# Patient Record
Sex: FEMALE | Race: ASIAN | NOT HISPANIC OR LATINO | URBAN - METROPOLITAN AREA
[De-identification: names, ages, dates, MRNs, and addresses within clinical notes are randomized per-mention and may not be internally consistent; named-entity substitution may affect disease eponyms.]

---

## 2022-05-06 ENCOUNTER — EMERGENCY (EMERGENCY)
Facility: HOSPITAL | Age: 22
LOS: 1 days | Discharge: HOME | End: 2022-05-06
Attending: EMERGENCY MEDICINE | Admitting: EMERGENCY MEDICINE
Payer: COMMERCIAL

## 2022-05-06 VITALS
SYSTOLIC BLOOD PRESSURE: 119 MMHG | RESPIRATION RATE: 18 BRPM | TEMPERATURE: 99 F | HEART RATE: 98 BPM | OXYGEN SATURATION: 100 % | DIASTOLIC BLOOD PRESSURE: 71 MMHG

## 2022-05-06 VITALS
SYSTOLIC BLOOD PRESSURE: 128 MMHG | TEMPERATURE: 99 F | RESPIRATION RATE: 18 BRPM | HEART RATE: 118 BPM | DIASTOLIC BLOOD PRESSURE: 83 MMHG | OXYGEN SATURATION: 100 %

## 2022-05-06 PROCEDURE — 99284 EMERGENCY DEPT VISIT MOD MDM: CPT

## 2022-05-06 RX ORDER — KETOROLAC TROMETHAMINE 30 MG/ML
15 SYRINGE (ML) INJECTION ONCE
Refills: 0 | Status: DISCONTINUED | OUTPATIENT
Start: 2022-05-06 | End: 2022-05-06

## 2022-05-06 RX ORDER — ONDANSETRON 8 MG/1
4 TABLET, FILM COATED ORAL ONCE
Refills: 0 | Status: COMPLETED | OUTPATIENT
Start: 2022-05-06 | End: 2022-05-06

## 2022-05-06 RX ORDER — ACETAMINOPHEN 500 MG
650 TABLET ORAL ONCE
Refills: 0 | Status: COMPLETED | OUTPATIENT
Start: 2022-05-06 | End: 2022-05-06

## 2022-05-06 RX ORDER — LIDOCAINE 4 G/100G
1 CREAM TOPICAL ONCE
Refills: 0 | Status: COMPLETED | OUTPATIENT
Start: 2022-05-06 | End: 2022-05-06

## 2022-05-06 RX ADMIN — LIDOCAINE 1 PATCH: 4 CREAM TOPICAL at 09:55

## 2022-05-06 RX ADMIN — ONDANSETRON 4 MILLIGRAM(S): 8 TABLET, FILM COATED ORAL at 09:54

## 2022-05-06 RX ADMIN — Medication 650 MILLIGRAM(S): at 09:54

## 2022-05-06 RX ADMIN — Medication 15 MILLIGRAM(S): at 10:09

## 2022-05-06 NOTE — ED PROVIDER NOTE - PHYSICAL EXAMINATION
Ines Kitchen MD  GENERAL: Patient awake alert NAD.  HEENT: NC/AT, Moist mucous membranes, PERRL, EOMI.  LUNGS: CTAB, no wheezes or crackles.   CARDIAC: RRR, no m/r/g.    ABDOMEN: Soft, NT, ND, No rebound, guarding. No CVA tenderness.   EXT: No edema. No calf tenderness.   MSK: No spinal tenderness, no pain with movement, no deformities. +mild tenderness at L arm, shoulder full ROM  NEURO: A&Ox3. Moving all extremities. cn 2-12 intact. strength and sensation intact bilateral   SKIN: Warm and dry. No rash.  PSYCH: Normal affect.
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
## 2022-05-06 NOTE — ED PROVIDER NOTE - NS ED ROS FT
GENERAL: No fever, chills. +headache  EYES: no vision changes, no discharge.   ENT: no difficulty swallowing or speaking   CARDIAC: no chest pain/pressure, SOB, lower extremity swelling  PULMONARY: no cough, SOB  GI: no abdominal pain, +nausea  : no dysuria  SKIN: no rashes  NEURO: no headache, lightheadedness, paresthesia  MSK: +L arm pain

## 2022-05-06 NOTE — ED ADULT NURSE NOTE - NSIMPLEMENTINTERV_GEN_ALL_ED
Implemented All Universal Safety Interventions:  [place] to call system. Call bell, personal items and telephone within reach. Instruct patient to call for assistance. Room bathroom lighting operational. Non-slip footwear when patient is off stretcher. Physically safe environment: no spills, clutter or unnecessary equipment. Stretcher in lowest position, wheels locked, appropriate side rails in place.

## 2022-05-06 NOTE — ED PROVIDER NOTE - PATIENT PORTAL LINK FT
You can access the FollowMyHealth Patient Portal offered by [facility] by registering at the following website: [email or web address]. By joining [employer]’s FollowMyHealth portal, you will also be able to view your health information using other applications (apps) compatible with our system.

## 2022-05-06 NOTE — ED ADULT NURSE REASSESSMENT NOTE - NS ED NURSE REASSESS COMMENT FT1
Pt unable to urinate, states LMP was last week and has regular period. Pt understand risks of ibuprofen and pregnancy and still wants the Toradol.
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
## 2022-05-06 NOTE — ED PROVIDER NOTE - PROGRESS NOTE DETAILS
Patient was re-evaluated and doing well. Results, including any incidental findings, were discussed. Return precautions and follow up were discussed. Patient verbalized understanding.  tachy 2/2 anxiety
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
## 2023-07-17 PROBLEM — Z00.00 ENCOUNTER FOR PREVENTIVE HEALTH EXAMINATION: Status: ACTIVE | Noted: 2023-07-17

## 2023-07-19 ENCOUNTER — APPOINTMENT (OUTPATIENT)
Dept: OTOLARYNGOLOGY | Facility: CLINIC | Age: 23
End: 2023-07-19
Payer: COMMERCIAL

## 2023-07-19 ENCOUNTER — NON-APPOINTMENT (OUTPATIENT)
Age: 23
End: 2023-07-19

## 2023-07-19 VITALS
DIASTOLIC BLOOD PRESSURE: 74 MMHG | SYSTOLIC BLOOD PRESSURE: 104 MMHG | BODY MASS INDEX: 29.99 KG/M2 | TEMPERATURE: 98 F | HEIGHT: 65 IN | HEART RATE: 76 BPM | WEIGHT: 180 LBS

## 2023-07-19 PROCEDURE — 92557 COMPREHENSIVE HEARING TEST: CPT

## 2023-07-19 PROCEDURE — 99214 OFFICE O/P EST MOD 30 MIN: CPT | Mod: 25

## 2023-07-19 PROCEDURE — 92567 TYMPANOMETRY: CPT | Mod: 22

## 2023-07-19 NOTE — HISTORY OF PRESENT ILLNESS
[de-identified] : Ms. NAGEL is a 23 year female with R sided pulsatile whooshing noise for 5 months worse with alcohol consumption and noise is unchanged for 5 months\par denies otalgia, otorrhea, vertigo, hearing loss.\par - MRI LHR 3 months ago 2/2 headaches\par - denies sinus pressure, rhinorrhea, nasal obstruction\par - denies dysphagia, dyspnea or dysphonia\par

## 2023-07-19 NOTE — DATA REVIEWED
[de-identified] : Hearing sensitivity is within normal limits, bilaterally\par slight asymmetry in RT ear at 250 and 500Hz\par ETF normal bilaterally

## 2023-07-19 NOTE — ASSESSMENT
[FreeTextEntry1] : R sided tinnitus\par \par - ear exam unremarkable today\par - audio today shows mild low pitched sensorineural hearing loss on the right\par - MRI brain LHR images reviewed, normal MRI brain/IAC\par – Given low pitched sensorineural hearing loss we will try Medrol Dosepak to see if this helps with tinnitus\par - will obtain MRA/MRV non contrast eval for unilateral pulsatile tinnitus ( message sent for auth at [facility] )\par – We will call once we obtain MRI results

## 2023-07-19 NOTE — CONSULT LETTER
[Dear  ___] : Dear  [unfilled], [Consult Letter:] : I had the pleasure of evaluating your patient, [unfilled]. [Sincerely,] : Sincerely, [FreeTextEntry3] : Katherine Galeas MD\par Otolaryngology- Facial Plastics \par [address]\[place] [address]\par (P) - [phone]\par (F) - [phone]\par

## 2023-07-19 NOTE — END OF VISIT
[FreeTextEntry3] : I personally saw and examined SERINA NAGEL in detail.  I spoke to DONA Loya regarding the assessment and plan of care. I performed the procedures and relevant physical exam.  I have reviewed the above assessment and plan of care and I agree.  I have made changes to the body of the note wherever necessary and appropriate.\par

## 2023-08-22 ENCOUNTER — NON-APPOINTMENT (OUTPATIENT)
Age: 23
End: 2023-08-22

## 2023-09-13 ENCOUNTER — APPOINTMENT (OUTPATIENT)
Dept: OTOLARYNGOLOGY | Facility: CLINIC | Age: 23
End: 2023-09-13
Payer: COMMERCIAL

## 2023-09-13 VITALS
TEMPERATURE: 98 F | HEIGHT: 65 IN | HEART RATE: 100 BPM | WEIGHT: 180 LBS | DIASTOLIC BLOOD PRESSURE: 73 MMHG | BODY MASS INDEX: 29.99 KG/M2 | SYSTOLIC BLOOD PRESSURE: 113 MMHG

## 2023-09-13 DIAGNOSIS — H93.11 TINNITUS, RIGHT EAR: ICD-10-CM

## 2023-09-13 DIAGNOSIS — G93.2 BENIGN INTRACRANIAL HYPERTENSION: ICD-10-CM

## 2023-09-13 PROCEDURE — 99213 OFFICE O/P EST LOW 20 MIN: CPT

## 2023-09-20 ENCOUNTER — NON-APPOINTMENT (OUTPATIENT)
Age: 23
End: 2023-09-20

## 2023-09-20 ENCOUNTER — APPOINTMENT (OUTPATIENT)
Dept: OPHTHALMOLOGY | Facility: CLINIC | Age: 23
End: 2023-09-20
Payer: COMMERCIAL

## 2023-09-20 PROCEDURE — 99204 OFFICE O/P NEW MOD 45 MIN: CPT

## 2023-09-20 PROCEDURE — 92133 CPTRZD OPH DX IMG PST SGM ON: CPT

## 2023-09-20 PROCEDURE — 92083 EXTENDED VISUAL FIELD XM: CPT

## 2023-10-05 ENCOUNTER — APPOINTMENT (OUTPATIENT)
Dept: NEUROSURGERY | Facility: CLINIC | Age: 23
End: 2023-10-05
Payer: COMMERCIAL

## 2023-10-05 ENCOUNTER — NON-APPOINTMENT (OUTPATIENT)
Age: 23
End: 2023-10-05

## 2023-10-05 VITALS
SYSTOLIC BLOOD PRESSURE: 102 MMHG | WEIGHT: 185 LBS | BODY MASS INDEX: 30.82 KG/M2 | HEART RATE: 96 BPM | DIASTOLIC BLOOD PRESSURE: 71 MMHG | HEIGHT: 65 IN | OXYGEN SATURATION: 98 %

## 2023-10-05 DIAGNOSIS — Z78.9 OTHER SPECIFIED HEALTH STATUS: ICD-10-CM

## 2023-10-05 DIAGNOSIS — Z83.49 FAMILY HISTORY OF OTHER ENDOCRINE, NUTRITIONAL AND METABOLIC DISEASES: ICD-10-CM

## 2023-10-05 DIAGNOSIS — Z83.3 FAMILY HISTORY OF DIABETES MELLITUS: ICD-10-CM

## 2023-10-05 PROCEDURE — 99203 OFFICE O/P NEW LOW 30 MIN: CPT

## 2023-10-05 RX ORDER — ACETAMINOPHEN 500 MG/1
500 TABLET, COATED ORAL
Refills: 0 | Status: ACTIVE | COMMUNITY

## 2023-10-05 RX ORDER — METHYLPREDNISOLONE 4 MG/1
4 TABLET ORAL
Qty: 1 | Refills: 0 | Status: DISCONTINUED | COMMUNITY
Start: 2023-07-19 | End: 2023-10-05

## 2023-11-15 ENCOUNTER — OUTPATIENT (OUTPATIENT)
Dept: OUTPATIENT SERVICES | Facility: HOSPITAL | Age: 23
LOS: 1 days | End: 2023-11-15
Payer: COMMERCIAL

## 2023-11-15 VITALS
HEART RATE: 83 BPM | RESPIRATION RATE: 20 BRPM | OXYGEN SATURATION: 100 % | SYSTOLIC BLOOD PRESSURE: 107 MMHG | HEIGHT: 65 IN | TEMPERATURE: 98 F | DIASTOLIC BLOOD PRESSURE: 74 MMHG | WEIGHT: 188.5 LBS

## 2023-11-15 DIAGNOSIS — H93.A9 PULSATILE TINNITUS, UNSPECIFIED EAR: ICD-10-CM

## 2023-11-15 DIAGNOSIS — Z01.818 ENCOUNTER FOR OTHER PREPROCEDURAL EXAMINATION: ICD-10-CM

## 2023-11-15 DIAGNOSIS — Z29.9 ENCOUNTER FOR PROPHYLACTIC MEASURES, UNSPECIFIED: ICD-10-CM

## 2023-11-15 LAB
ANION GAP SERPL CALC-SCNC: 11 MMOL/L — SIGNIFICANT CHANGE UP (ref 5–17)
ANION GAP SERPL CALC-SCNC: 11 MMOL/L — SIGNIFICANT CHANGE UP (ref 5–17)
APTT BLD: 36.8 SEC — HIGH (ref 24.5–35.6)
APTT BLD: 36.8 SEC — HIGH (ref 24.5–35.6)
BLD GP AB SCN SERPL QL: NEGATIVE — SIGNIFICANT CHANGE UP
BLD GP AB SCN SERPL QL: NEGATIVE — SIGNIFICANT CHANGE UP
BUN SERPL-MCNC: 9 MG/DL — SIGNIFICANT CHANGE UP (ref 7–23)
BUN SERPL-MCNC: 9 MG/DL — SIGNIFICANT CHANGE UP (ref 7–23)
CALCIUM SERPL-MCNC: 9.4 MG/DL — SIGNIFICANT CHANGE UP (ref 8.4–10.5)
CALCIUM SERPL-MCNC: 9.4 MG/DL — SIGNIFICANT CHANGE UP (ref 8.4–10.5)
CHLORIDE SERPL-SCNC: 104 MMOL/L — SIGNIFICANT CHANGE UP (ref 96–108)
CHLORIDE SERPL-SCNC: 104 MMOL/L — SIGNIFICANT CHANGE UP (ref 96–108)
CO2 SERPL-SCNC: 25 MMOL/L — SIGNIFICANT CHANGE UP (ref 22–31)
CO2 SERPL-SCNC: 25 MMOL/L — SIGNIFICANT CHANGE UP (ref 22–31)
CREAT SERPL-MCNC: 0.57 MG/DL — SIGNIFICANT CHANGE UP (ref 0.5–1.3)
CREAT SERPL-MCNC: 0.57 MG/DL — SIGNIFICANT CHANGE UP (ref 0.5–1.3)
EGFR: 130 ML/MIN/1.73M2 — SIGNIFICANT CHANGE UP
EGFR: 130 ML/MIN/1.73M2 — SIGNIFICANT CHANGE UP
GLUCOSE SERPL-MCNC: 86 MG/DL — SIGNIFICANT CHANGE UP (ref 70–99)
GLUCOSE SERPL-MCNC: 86 MG/DL — SIGNIFICANT CHANGE UP (ref 70–99)
HCT VFR BLD CALC: 35.1 % — SIGNIFICANT CHANGE UP (ref 34.5–45)
HCT VFR BLD CALC: 35.1 % — SIGNIFICANT CHANGE UP (ref 34.5–45)
HGB BLD-MCNC: 11.3 G/DL — LOW (ref 11.5–15.5)
HGB BLD-MCNC: 11.3 G/DL — LOW (ref 11.5–15.5)
INR BLD: 1.01 RATIO — SIGNIFICANT CHANGE UP (ref 0.85–1.18)
INR BLD: 1.01 RATIO — SIGNIFICANT CHANGE UP (ref 0.85–1.18)
MCHC RBC-ENTMCNC: 28.8 PG — SIGNIFICANT CHANGE UP (ref 27–34)
MCHC RBC-ENTMCNC: 28.8 PG — SIGNIFICANT CHANGE UP (ref 27–34)
MCHC RBC-ENTMCNC: 32.2 GM/DL — SIGNIFICANT CHANGE UP (ref 32–36)
MCHC RBC-ENTMCNC: 32.2 GM/DL — SIGNIFICANT CHANGE UP (ref 32–36)
MCV RBC AUTO: 89.3 FL — SIGNIFICANT CHANGE UP (ref 80–100)
MCV RBC AUTO: 89.3 FL — SIGNIFICANT CHANGE UP (ref 80–100)
NRBC # BLD: 0 /100 WBCS — SIGNIFICANT CHANGE UP (ref 0–0)
NRBC # BLD: 0 /100 WBCS — SIGNIFICANT CHANGE UP (ref 0–0)
PLATELET # BLD AUTO: 290 K/UL — SIGNIFICANT CHANGE UP (ref 150–400)
PLATELET # BLD AUTO: 290 K/UL — SIGNIFICANT CHANGE UP (ref 150–400)
POTASSIUM SERPL-MCNC: 3.9 MMOL/L — SIGNIFICANT CHANGE UP (ref 3.5–5.3)
POTASSIUM SERPL-MCNC: 3.9 MMOL/L — SIGNIFICANT CHANGE UP (ref 3.5–5.3)
POTASSIUM SERPL-SCNC: 3.9 MMOL/L — SIGNIFICANT CHANGE UP (ref 3.5–5.3)
POTASSIUM SERPL-SCNC: 3.9 MMOL/L — SIGNIFICANT CHANGE UP (ref 3.5–5.3)
PROTHROM AB SERPL-ACNC: 11.1 SEC — SIGNIFICANT CHANGE UP (ref 9.5–13)
PROTHROM AB SERPL-ACNC: 11.1 SEC — SIGNIFICANT CHANGE UP (ref 9.5–13)
RBC # BLD: 3.93 M/UL — SIGNIFICANT CHANGE UP (ref 3.8–5.2)
RBC # BLD: 3.93 M/UL — SIGNIFICANT CHANGE UP (ref 3.8–5.2)
RBC # FLD: 13.2 % — SIGNIFICANT CHANGE UP (ref 10.3–14.5)
RBC # FLD: 13.2 % — SIGNIFICANT CHANGE UP (ref 10.3–14.5)
RH IG SCN BLD-IMP: POSITIVE — SIGNIFICANT CHANGE UP
RH IG SCN BLD-IMP: POSITIVE — SIGNIFICANT CHANGE UP
SODIUM SERPL-SCNC: 140 MMOL/L — SIGNIFICANT CHANGE UP (ref 135–145)
SODIUM SERPL-SCNC: 140 MMOL/L — SIGNIFICANT CHANGE UP (ref 135–145)
WBC # BLD: 7.59 K/UL — SIGNIFICANT CHANGE UP (ref 3.8–10.5)
WBC # BLD: 7.59 K/UL — SIGNIFICANT CHANGE UP (ref 3.8–10.5)
WBC # FLD AUTO: 7.59 K/UL — SIGNIFICANT CHANGE UP (ref 3.8–10.5)
WBC # FLD AUTO: 7.59 K/UL — SIGNIFICANT CHANGE UP (ref 3.8–10.5)

## 2023-11-15 PROCEDURE — 80048 BASIC METABOLIC PNL TOTAL CA: CPT

## 2023-11-15 PROCEDURE — 86850 RBC ANTIBODY SCREEN: CPT

## 2023-11-15 PROCEDURE — G0463: CPT

## 2023-11-15 PROCEDURE — 86901 BLOOD TYPING SEROLOGIC RH(D): CPT

## 2023-11-15 PROCEDURE — 85730 THROMBOPLASTIN TIME PARTIAL: CPT

## 2023-11-15 PROCEDURE — 85027 COMPLETE CBC AUTOMATED: CPT

## 2023-11-15 PROCEDURE — 86900 BLOOD TYPING SEROLOGIC ABO: CPT

## 2023-11-15 PROCEDURE — 85610 PROTHROMBIN TIME: CPT

## 2023-11-15 NOTE — H&P PST ADULT - GENITOURINARY
Requested updates within Care Everywhere.  Patient's chart was reviewed for overdue MIAH topics.  Immunizations reconciled.         negative Glycopyrrolate Counseling:  I discussed with the patient the risks of glycopyrrolate including but not limited to skin rash, drowsiness, dry mouth, difficulty urinating, and blurred vision.

## 2023-11-15 NOTE — H&P PST ADULT - NSICDXPASTMEDICALHX_GEN_ALL_CORE_FT
PAST MEDICAL HISTORY:  History of migraine headaches     Knee pain     Pulsatile tinnitus     Seasonal allergies

## 2023-11-15 NOTE — H&P PST ADULT - ASSESSMENT
CAPRINI SCORE [CLOT updated 18]    AGE RELATED RISK FACTORS                                                       MOBILITY RELATED FACTORS  [ ] Age 41-60 years                                            (1 Point)                    [ ] Bed rest                                                        (1 Point)  [ ] Age: 61-74 years                                           (2 Points)                  [ ] Plaster cast                                                   (2 Points)  [ ] Age= 75 years                                              (3 Points)                    [ ] Bed bound for more than 72 hours                 (2 Points)    DISEASE RELATED RISK FACTORS                                               GENDER SPECIFIC FACTORS  [ ] Edema in the lower extremities                       (1 Point)              [ ] Pregnancy                                                     (1 Point)  [ ] Varicose veins                                               (1 Point)                     [ ] Post-partum < 6 weeks                                   (1 Point)             [1 ] BMI > 25 Kg/m2                                            (1 Point)                     [ ] Hormonal therapy  or oral contraception          (1 Point)                 [ ] Sepsis (in the previous month)                        (1 Point)               [ ] History of pregnancy complications                 (1 point)  [ ] Pneumonia or serious lung disease                                               [ ] Unexplained or recurrent                     (1 Point)           (in the previous month)                               (1 Point)  [ ] Abnormal pulmonary function test                     (1 Point)                 SURGERY RELATED RISK FACTORS  [ ] Acute myocardial infarction                              (1 Point)               [ ]  Section                                             (1 Point)  [ ] Congestive heart failure (in the previous month)  (1 Point)      [ ] Minor surgery                                                  (1 Point)   [ ] Inflammatory bowel disease                             (1 Point)               [ ] Arthroscopic surgery                                        (2 Points)  [ ] Central venous access                                      (2 Points)                [ 2] General surgery lasting more than 45 minutes (2 points)  [ ] Present or previous malignancy                     (2 Points)                [ ] Elective arthroplasty                                         (5 points)    [ ] Stroke (in the previous month)                          (5 Points)                                                                                                                                                           HEMATOLOGY RELATED FACTORS                                                 TRAUMA RELATED RISK FACTORS  [ ] Prior episodes of VTE                                     (3 Points)                [ ] Fracture of the hip, pelvis, or leg                       (5 Points)  [ ] Positive family history for VTE                         (3 Points)             [ ] Acute spinal cord injury (in the previous month)  (5 Points)  [ ] Prothrombin 88134 A                                     (3 Points)               [ ] Paralysis  (less than 1 month)                             (5 Points)  [ ] Factor V Leiden                                             (3 Points)                  [ ] Multiple Trauma within 1 month                        (5 Points)  [ ] Lupus anticoagulants                                     (3 Points)                                                           [ ] Anticardiolipin antibodies                               (3 Points)                                                       [ ] High homocysteine in the blood                      (3 Points)                                             [ ] Other congenital or acquired thrombophilia      (3 Points)                                                [ ] Heparin induced thrombocytopenia                  (3 Points)                                     Total Score [3]    Dasi activities: uses the treadmill 2x/week, does a lot of walking  Dasi score: 8.89  no loose or broken tooth

## 2023-11-15 NOTE — H&P PST ADULT - PROBLEM SELECTOR PLAN 1
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

## 2023-11-15 NOTE — H&P PST ADULT - NSANTHOSAYNRD_GEN_A_CORE
No. GENA screening performed.  STOP BANG Legend: 0-2 = LOW Risk; 3-4 = INTERMEDIATE Risk; 5-8 = HIGH Risk

## 2023-11-15 NOTE — H&P PST ADULT - NSICDXFAMILYHX_GEN_ALL_CORE_FT
FAMILY HISTORY:  Father  Still living? Yes, Estimated age: Age Unknown  FH: type 2 diabetes, Age at diagnosis: Age Unknown    Mother  Still living? Yes, Estimated age: Age Unknown  FH: thyroid condition, Age at diagnosis: Age Unknown

## 2023-11-15 NOTE — H&P PST ADULT - HISTORY OF PRESENT ILLNESS
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 Her medical history significant for migraine headache, seasonal allergies. Patient denies any headaches, no dizziness, no N/V.

## 2023-11-22 ENCOUNTER — INPATIENT (INPATIENT)
Facility: HOSPITAL | Age: 23
LOS: 0 days | Discharge: HOME | DRG: 983 | End: 2023-11-22
Attending: RADIOLOGY | Admitting: RADIOLOGY
Payer: COMMERCIAL

## 2023-11-22 ENCOUNTER — APPOINTMENT (OUTPATIENT)
Dept: NEUROSURGERY | Facility: HOSPITAL | Age: 23
End: 2023-11-22
Payer: COMMERCIAL

## 2023-11-22 ENCOUNTER — TRANSCRIPTION ENCOUNTER (OUTPATIENT)
Age: 23
End: 2023-11-22

## 2023-11-22 VITALS
DIASTOLIC BLOOD PRESSURE: 73 MMHG | RESPIRATION RATE: 18 BRPM | OXYGEN SATURATION: 99 % | HEART RATE: 102 BPM | WEIGHT: 184.97 LBS | TEMPERATURE: 98 F | SYSTOLIC BLOOD PRESSURE: 121 MMHG | HEIGHT: 65 IN

## 2023-11-22 VITALS
OXYGEN SATURATION: 99 % | SYSTOLIC BLOOD PRESSURE: 110 MMHG | RESPIRATION RATE: 16 BRPM | HEART RATE: 89 BPM | DIASTOLIC BLOOD PRESSURE: 85 MMHG

## 2023-11-22 DIAGNOSIS — Z01.818 ENCOUNTER FOR OTHER PREPROCEDURAL EXAMINATION: ICD-10-CM

## 2023-11-22 DIAGNOSIS — H93.A9 PULSATILE TINNITUS, UNSPECIFIED EAR: ICD-10-CM

## 2023-11-22 PROCEDURE — 75870 VEIN X-RAY SKULL: CPT | Mod: 26,59

## 2023-11-22 PROCEDURE — 36226 PLACE CATH VERTEBRAL ART: CPT | Mod: 50

## 2023-11-22 PROCEDURE — C9399: CPT

## 2023-11-22 PROCEDURE — 37253 INTRVASC US NONCORONARY ADDL: CPT

## 2023-11-22 PROCEDURE — 36012 PLACE CATHETER IN VEIN: CPT

## 2023-11-22 PROCEDURE — 36226 PLACE CATH VERTEBRAL ART: CPT

## 2023-11-22 PROCEDURE — C1760: CPT

## 2023-11-22 PROCEDURE — C1876: CPT

## 2023-11-22 PROCEDURE — 36223 PLACE CATH CAROTID/INOM ART: CPT | Mod: 50

## 2023-11-22 PROCEDURE — 37238 OPEN/PERQ PLACE STENT SAME: CPT | Mod: 59,RT

## 2023-11-22 PROCEDURE — C1769: CPT

## 2023-11-22 PROCEDURE — 36223 PLACE CATH CAROTID/INOM ART: CPT

## 2023-11-22 PROCEDURE — C1894: CPT

## 2023-11-22 PROCEDURE — 75860 VEIN X-RAY NECK: CPT | Mod: 26,59

## 2023-11-22 PROCEDURE — C1887: CPT

## 2023-11-22 PROCEDURE — 61624 TCAT PERM OCCLS/EMBOLJ CNS: CPT

## 2023-11-22 PROCEDURE — C1753: CPT

## 2023-11-22 PROCEDURE — 75898 FOLLOW-UP ANGIOGRAPHY: CPT

## 2023-11-22 PROCEDURE — 75894 X-RAYS TRANSCATH THERAPY: CPT

## 2023-11-22 PROCEDURE — 37252 INTRVASC US NONCORONARY 1ST: CPT

## 2023-11-22 PROCEDURE — 75894 X-RAYS TRANSCATH THERAPY: CPT | Mod: 26

## 2023-11-22 PROCEDURE — 75898 FOLLOW-UP ANGIOGRAPHY: CPT | Mod: 26

## 2023-11-22 RX ORDER — ONDANSETRON 8 MG/1
4 TABLET, FILM COATED ORAL ONCE
Refills: 0 | Status: DISCONTINUED | OUTPATIENT
Start: 2023-11-22 | End: 2023-11-22

## 2023-11-22 RX ORDER — CLOPIDOGREL BISULFATE 75 MG/1
1 TABLET, FILM COATED ORAL
Refills: 0 | DISCHARGE

## 2023-11-22 RX ORDER — OXYCODONE AND ACETAMINOPHEN 5; 325 MG/1; MG/1
1 TABLET ORAL
Qty: 16 | Refills: 0
Start: 2023-11-22 | End: 2023-11-25

## 2023-11-22 RX ORDER — KETOROLAC TROMETHAMINE 30 MG/ML
30 SYRINGE (ML) INJECTION ONCE
Refills: 0 | Status: DISCONTINUED | OUTPATIENT
Start: 2023-11-22 | End: 2023-11-22

## 2023-11-22 RX ORDER — OMEGA-3 ACID ETHYL ESTERS 1 G
1 CAPSULE ORAL
Refills: 0 | DISCHARGE

## 2023-11-22 RX ORDER — ASPIRIN/CALCIUM CARB/MAGNESIUM 324 MG
1 TABLET ORAL
Refills: 0 | DISCHARGE

## 2023-11-22 RX ORDER — SODIUM CHLORIDE 9 MG/ML
1000 INJECTION INTRAMUSCULAR; INTRAVENOUS; SUBCUTANEOUS
Refills: 0 | Status: DISCONTINUED | OUTPATIENT
Start: 2023-11-22 | End: 2023-11-22

## 2023-11-22 RX ORDER — MAGNESIUM OXIDE 400 MG ORAL TABLET 241.3 MG
0 TABLET ORAL
Refills: 0 | DISCHARGE

## 2023-11-22 RX ADMIN — SODIUM CHLORIDE 70 MILLILITER(S): 9 INJECTION INTRAMUSCULAR; INTRAVENOUS; SUBCUTANEOUS at 20:04

## 2023-11-22 RX ADMIN — Medication 30 MILLIGRAM(S): at 20:04

## 2023-11-22 RX ADMIN — Medication 30 MILLIGRAM(S): at 17:05

## 2023-11-22 NOTE — ASU DISCHARGE PLAN (ADULT/PEDIATRIC) - ASU DC SPECIAL INSTRUCTIONSFT
Please take aspirin and Plavix daily     Please take medrol dosepak   6 tablets on Thursday   5 tablets on Friday   4 tablets on Saturday   3 tablets on Sunday   2 tablets on Monday   1 tablets on tuesday

## 2023-11-22 NOTE — ASU PATIENT PROFILE, ADULT - FALL HARM RISK - UNIVERSAL INTERVENTIONS
Bed in lowest position, wheels locked, appropriate side rails in place/Call bell, personal items and telephone in reach/Instruct patient to call for assistance before getting out of bed or chair/Non-slip footwear when patient is out of bed/[place] to call system/Physically safe environment - no spills, clutter or unnecessary equipment/Purposeful Proactive Rounding/Room/bathroom lighting operational, light cord in reach

## 2023-11-22 NOTE — CHART NOTE - NSCHARTNOTEFT_GEN_A_CORE
Interventional Neuro Radiology  Pre-Procedure Note PA-C      This is a 23 year old female c/o worsen whooshing sound in her ear since February 2023, had an MRI back in August 2023, presents today for preop evaluation for scheduled Stent assisted embolization of a wide neck venous aneurysm for pulsatile tinnitus on 11/22/2023. Her medical history significant for migraine headache, seasonal allergies. Patient denies any headaches, no dizziness, no N/V. (15 Nov 2023 14:02)      Allergies: No Known Allergies  PMHX: Pulsatile tinnitus, Knee pain, Migraine headaches, Seasonal allergies  PSHX: No significant past surgical history  Social History:   FAMILY HISTORY:  FH: thyroid condition (Mother)  FH: type 2 diabetes (Father)  Current Medications:     Labs:                   Blood Bank:       Assessment/Plan:     This is a 23y  year old right  hand dominant Female  presents with   Patient presents to neuro-IR for selective cerebral angiography.   Procedure, goals, risks, benefits and alternatives  were discussed with patient and patient's family.  All questions were answered.  Risks include but are not limited to stroke, vessel injury, hemorrhage, and or right  groin hematoma.  Patient demonstrates understanding  of all risks involved with this procedure and wishes to continue.   Appropriate  content was obtained from patient and consent is in the patient's chart. 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 Her medical history significant for migraine headache, seasonal allergies.       Allergies: No Known Allergies  PMHX: Pulsatile tinnitus, Knee pain, Migraine headaches, Seasonal allergies  PSHX: No significant past surgical history  Social History: Non-tobacco smoker   FAMILY HISTORY:  FH: thyroid condition (Mother)  FH: type 2 diabetes (Father)  Current Medications:             11.3  7.59   35.1   290    140  104   9    3.9   25   0.57 86    Blood Bank: AB positive     Assessment/Plan:   This is a 23 year old right hand dominant female with right ear tinnitus, who presents to Neuro IR for a catheter cerebral venogram, angiogram, manometry and possible venous sinus stenting. Procedure, goals, risks, benefits and alternatives were discussed with patient and patient's mother. All questions were answered. Risks include but are not limited to stroke, vessel injury, hemorrhage, and or right groin hematoma. Patient demonstrates understanding of all risks involved with this procedure and wishes to continue. Appropriate consent was obtained from patient and consent is in the patient's chart.

## 2023-11-22 NOTE — CHART NOTE - NSCHARTNOTEFT_GEN_A_CORE
Interventional Neuro- Radiology   Procedure Note PA-TOMASA    Procedure: Catheter cerebral venogram, angiogram, manometry, and venous sinus stenting    Pre- Procedure Diagnosis:  Post- Procedure Diagnosis:    : Dr Leigh Valentine  Fellow:     Dr Tyrone Gutierres   Physician Assistant: Elsa Clement PA-C    Nurse:  Radiologic Tech:  Anesthesiologist:  Sheath:  Sheath:  Barbeau:       I/Os: EBL less than 10cc  IV fluids: 300cc  Urine due to void   Contrast Visi            Vitals: BP         HR      Spo2 100%          Preliminary Report:  Using a 8 Singaporean short sheath to the right groin under MAC sedation a catheter cerebral venogram, angiogram, manometry and right venous sinus stenting was performed. Official note to follow.  Patient tolerated procedure well, hemodynamically stable, no change in neurological status compared to baseline. Results discussed with patient and patient's family. Right groin sheath was removed, manual compression held to hemostasis for 20 minutes, no active bleeding, no hematoma, quick clot and safeguard balloon dressing applied at Interventional Neuro- Radiology   Procedure Note PA-C    Procedure: Catheter cerebral venogram, angiogram, manometry, and right transverse-sigmoid venous sinus stenting    Pre- Procedure Diagnosis: right ear tinnitus   Post- Procedure Diagnosis:    : Dr Leigh Valentine  Fellow:     Dr Tyrone Gutierres   Physician Assistant: Elsa Clement PA-C    Nurse:                   Rajat Clemente RN  Radiologic Tech:   Ziyad Washington LRT  Anesthesiologist:  Dr Sergio Barron   Sheath:                 8 Bengali short sheath   Sheath:                 5/4 Bengali short sheath   Barbeau:               A        I/Os: EBL less than 10cc  IV fluids: 350cc  Urine due to void   Contrast Visi  89cc     ACT  150    Heparin 1,ooo  Heparin 3,ooo       SSS   21          9  PT     21          8  DT     21          8      PS      6           7  DS      5           7 Jugular bulb   Jug     5    Cordis Precise 8mm by 40mm    Vitals: BP         HR      Spo2 100%          Preliminary Report:  Using a 8 Bengali short sheath to the right groin under MAC sedation and later under general sedation a catheter cerebral venogram, angiogram, manometry and right venous sinus stenting was performed. Official note to follow.  Patient tolerated procedure well, hemodynamically stable, no change in neurological status compared to baseline. Results discussed with patient and patient's family. Right groin sheath was removed, a Vascade device and manual compression held to hemostasis for 20 minutes, no active bleeding, no hematoma, quick clot and safeguard balloon dressing applied at 4:30pm. Right wrist band placed at 4:30pm.

## 2023-11-22 NOTE — ASU DISCHARGE PLAN (ADULT/PEDIATRIC) - CARE PROVIDER_API CALL
Leigh Valentine  Radiology  [address]  [place] [address]  Phone: [phone]  Fax: [phone]  Follow Up Time:

## 2023-11-22 NOTE — ASU DISCHARGE PLAN (ADULT/PEDIATRIC) - NURSING INSTRUCTIONS
Please feel free to contact us at [phone] if any problems arise. After 6PM, Monday through Friday, on weekends and on holidays, please call [phone] and ask for the radiology resident on call to be paged.

## 2023-12-10 ENCOUNTER — RX RENEWAL (OUTPATIENT)
Age: 23
End: 2023-12-10

## 2023-12-29 PROBLEM — M25.569 PAIN IN UNSPECIFIED KNEE: Chronic | Status: ACTIVE | Noted: 2023-11-15

## 2023-12-29 PROBLEM — H93.A9 PULSATILE TINNITUS, UNSPECIFIED EAR: Chronic | Status: ACTIVE | Noted: 2023-11-15

## 2023-12-29 PROBLEM — J30.2 OTHER SEASONAL ALLERGIC RHINITIS: Chronic | Status: ACTIVE | Noted: 2023-11-15

## 2023-12-29 PROBLEM — Z86.69 PERSONAL HISTORY OF OTHER DISEASES OF THE NERVOUS SYSTEM AND SENSE ORGANS: Chronic | Status: ACTIVE | Noted: 2023-11-15

## 2024-01-16 RX ORDER — ASPIRIN 325 MG/1
325 TABLET, FILM COATED ORAL DAILY
Qty: 90 | Refills: 3 | Status: ACTIVE | COMMUNITY
Start: 2023-11-06 | End: 1900-01-01

## 2024-01-19 ENCOUNTER — APPOINTMENT (OUTPATIENT)
Dept: NEUROSURGERY | Facility: CLINIC | Age: 24
End: 2024-01-19
Payer: COMMERCIAL

## 2024-01-19 PROCEDURE — 99212 OFFICE O/P EST SF 10 MIN: CPT | Mod: 95

## 2024-01-19 RX ORDER — CLOPIDOGREL BISULFATE 75 MG/1
75 TABLET, FILM COATED ORAL DAILY
Qty: 30 | Refills: 1 | Status: DISCONTINUED | COMMUNITY
Start: 2023-11-06 | End: 2024-01-19

## 2024-01-19 NOTE — ASSESSMENT
[FreeTextEntry1] : Impression: s/p Successful Venous Sinus Stenting for Pulsatile Tinnitus on 11/22/23 Pulsatile Tinnitus Completely Resolved No change in pre procedure headaches Feeling well without complaints  Will continue routine follow up.    Plan: Continue ASA 325mg Daily MRV Head w/wo in 11/2024 There is no contraindication to MRI from a neurosurgical perspective Follow Up with Dr. Valentine After Imaging

## 2024-01-19 NOTE — REASON FOR VISIT
[Home] : at home, [unfilled] , at the time of the visit. [Medical Office: ([facility])___] : at the medical office located in  [Patient] : the patient [Self] : self [Follow-Up: _____] : a [unfilled] follow-up visit

## 2024-01-19 NOTE — HISTORY OF PRESENT ILLNESS
[FreeTextEntry1] : Ms. NAGEL is a pleasant 24yo female who presents today after successful venous sinus stenting on 11/22/23 by Dr. Valentine.  The patient went home the same day in good condition.  Her pulsatile tinnitus has completely resolved.  Her headaches are the same as pre-procedure which is what she expected.    She remains compliant with ASA 325mg daily.  She stopped plavix earlier this week.

## 2024-02-23 DIAGNOSIS — R20.0 ANESTHESIA OF SKIN: ICD-10-CM

## 2024-02-24 ENCOUNTER — TRANSCRIPTION ENCOUNTER (OUTPATIENT)
Age: 24
End: 2024-02-24

## 2024-02-24 ENCOUNTER — APPOINTMENT (OUTPATIENT)
Dept: MRI IMAGING | Facility: CLINIC | Age: 24
End: 2024-02-24
Payer: COMMERCIAL

## 2024-02-24 ENCOUNTER — OUTPATIENT (OUTPATIENT)
Dept: OUTPATIENT SERVICES | Facility: HOSPITAL | Age: 24
LOS: 1 days | End: 2024-02-24
Payer: COMMERCIAL

## 2024-02-24 DIAGNOSIS — R20.0 ANESTHESIA OF SKIN: ICD-10-CM

## 2024-02-24 PROCEDURE — 70551 MRI BRAIN STEM W/O DYE: CPT

## 2024-02-24 PROCEDURE — 70551 MRI BRAIN STEM W/O DYE: CPT | Mod: 26

## 2024-03-05 ENCOUNTER — APPOINTMENT (OUTPATIENT)
Dept: NEUROSURGERY | Facility: CLINIC | Age: 24
End: 2024-03-05
Payer: COMMERCIAL

## 2024-03-05 ENCOUNTER — APPOINTMENT (OUTPATIENT)
Dept: NEUROSURGERY | Facility: CLINIC | Age: 24
End: 2024-03-05

## 2024-03-05 DIAGNOSIS — H93.A9 PULSATILE TINNITUS, UNSPECIFIED EAR: ICD-10-CM

## 2024-03-05 PROCEDURE — 99442: CPT

## 2024-03-05 NOTE — ASSESSMENT
[FreeTextEntry1] : Impression: s/p Successful Venous Sinus Stenting for Pulsatile Tinnitus on 11/22/23 Pulsatile Tinnitus Completely Resolved No change in pre procedure headaches  MRI Head 2/2024 reveals no tumor, stroke or hemorrhage  I reassured Ms. Mcwilliams that her MRI is normal and I am unsure how to explain the brief periord of right sided weakness she experienced.  If it happens again, she will see a neurologist.  Will continue routine follow up.    Plan: Continue ASA 325mg Daily MRV Head w/wo in 11/2024 Follow Up with Me After Imaging

## 2024-03-05 NOTE — REASON FOR VISIT
[Home] : at home, [unfilled] , at the time of the visit. [Verbal consent obtained from patient] : the patient, [unfilled] [Medical Office: ([facility])___] : at the medical office located in  [Follow-Up: _____] : a [unfilled] follow-up visit

## 2024-03-05 NOTE — HISTORY OF PRESENT ILLNESS
[FreeTextEntry1] : Ms. NAGEL is a pleasant 22yo female who presents today after successful venous sinus stenting on 11/22/23.  The patient went home the same day in good condition.  Her pulsatile tinnitus has completely resolved.  Her headaches are the same as pre-procedure which is what she expected.    She remains compliant with ASA 325mg daily.    On 2/23/24, she states she started to experience complete right sided numbness that resolved on it's own.  She called the office, and an MRI was ordered.  She states the symptoms happened once the following day and not again.

## 2024-11-14 ENCOUNTER — APPOINTMENT (OUTPATIENT)
Dept: NEUROSURGERY | Facility: CLINIC | Age: 24
End: 2024-11-14

## 2024-12-10 ENCOUNTER — APPOINTMENT (OUTPATIENT)
Dept: NEUROSURGERY | Facility: CLINIC | Age: 24
End: 2024-12-10

## 2025-01-20 ENCOUNTER — APPOINTMENT (OUTPATIENT)
Dept: NEUROSURGERY | Facility: CLINIC | Age: 25
End: 2025-01-20
Payer: COMMERCIAL

## 2025-01-20 DIAGNOSIS — H93.A9 PULSATILE TINNITUS, UNSPECIFIED EAR: ICD-10-CM

## 2025-01-20 PROCEDURE — 99212 OFFICE O/P EST SF 10 MIN: CPT | Mod: 93

## 2025-02-25 ENCOUNTER — APPOINTMENT (OUTPATIENT)
Dept: NEUROSURGERY | Facility: CLINIC | Age: 25
End: 2025-02-25